# Patient Record
Sex: FEMALE | Race: AMERICAN INDIAN OR ALASKA NATIVE
[De-identification: names, ages, dates, MRNs, and addresses within clinical notes are randomized per-mention and may not be internally consistent; named-entity substitution may affect disease eponyms.]

---

## 2018-07-09 ENCOUNTER — HOSPITAL ENCOUNTER (OUTPATIENT)
Dept: HOSPITAL 5 - XRAY | Age: 33
Discharge: HOME | End: 2018-07-09
Attending: INTERNAL MEDICINE
Payer: COMMERCIAL

## 2018-07-09 DIAGNOSIS — G43.909: ICD-10-CM

## 2018-07-09 DIAGNOSIS — G44.89: ICD-10-CM

## 2018-07-09 DIAGNOSIS — F41.9: ICD-10-CM

## 2018-07-09 DIAGNOSIS — F32.9: ICD-10-CM

## 2018-07-09 DIAGNOSIS — G47.9: ICD-10-CM

## 2018-07-09 DIAGNOSIS — B20: Primary | ICD-10-CM

## 2018-07-09 PROCEDURE — 72100 X-RAY EXAM L-S SPINE 2/3 VWS: CPT

## 2018-07-09 NOTE — XRAY REPORT
FINAL REPORT



EXAM:  XR SPINE LUMBOSACRAL 2-3V



HISTORY:  HIV/AIDS, ANXIETY, DEPRESSION, SLEEP DISORDER,

HEADACHES 



TECHNIQUE:  AP, lateral and coned-down views of lumbar spine.



PRIORS:  None.



FINDINGS:  

No loss of height or gross malalignment of lumbar vertebral

bodies. No obvious osseous destruction. 



Lumbar disc spaces maintained.  



Paraspinal soft tissues grossly unremarkable. 



IMPRESSION:  

1. No acute osseous abnormality.

## 2018-07-09 NOTE — XRAY REPORT
FINAL REPORT



EXAM:  XR KNEE BILAT 1-2V



HISTORY:  HIV/AIDS, ANXIETY, DEPRESSION, SLEEP DISORDER,

HEADACHES 



TECHNIQUE:  2 views of both right and left knees. 



PRIORS:  None.



FINDINGS:  

Joint spaces maintained. No apparent fracture or dislocation.

Soft tissues grossly unremarkable. 



IMPRESSION:  

1. No acute osseous abnormality.